# Patient Record
Sex: FEMALE | Race: WHITE | URBAN - NONMETROPOLITAN AREA
[De-identification: names, ages, dates, MRNs, and addresses within clinical notes are randomized per-mention and may not be internally consistent; named-entity substitution may affect disease eponyms.]

---

## 2021-08-18 ENCOUNTER — IMPORTED ENCOUNTER (OUTPATIENT)
Dept: URBAN - NONMETROPOLITAN AREA CLINIC 1 | Facility: CLINIC | Age: 26
End: 2021-08-18

## 2021-08-18 PROBLEM — H52.13: Noted: 2021-08-18

## 2021-08-18 PROCEDURE — 92004 COMPRE OPH EXAM NEW PT 1/>: CPT

## 2021-08-18 PROCEDURE — 92015 DETERMINE REFRACTIVE STATE: CPT

## 2021-08-18 PROCEDURE — 92310 CONTACT LENS FITTING OU: CPT

## 2021-08-18 NOTE — PATIENT DISCUSSION
Simple Myopia OU-  discussed findings w/patient-  new spectacle/CL Rx issued-  continue to monitor yearly or prn; 's Notes: MR 8/18/2021DFE 8/18/2021

## 2021-08-25 ENCOUNTER — IMPORTED ENCOUNTER (OUTPATIENT)
Dept: URBAN - NONMETROPOLITAN AREA CLINIC 1 | Facility: CLINIC | Age: 26
End: 2021-08-25

## 2022-04-09 ASSESSMENT — TONOMETRY
OS_IOP_MMHG: 15
OD_IOP_MMHG: 16

## 2022-04-09 ASSESSMENT — VISUAL ACUITY
OS_SC: 20/20
OU_CC: 20/20
OU_SC: 20/20
OU_SC: 20/20
OD_SC: 20/20
OD_SC: 20/20
OS_SC: 20/20